# Patient Record
Sex: FEMALE | URBAN - NONMETROPOLITAN AREA
[De-identification: names, ages, dates, MRNs, and addresses within clinical notes are randomized per-mention and may not be internally consistent; named-entity substitution may affect disease eponyms.]

---

## 2019-05-17 ENCOUNTER — NURSE TRIAGE (OUTPATIENT)
Dept: ADMINISTRATIVE | Age: 69
End: 2019-05-17

## 2019-05-18 NOTE — TELEPHONE ENCOUNTER
Message from Peter Parkinson sent at 5/17/2019  8:05 PM EDT     Summary: confusion    Kristen Casillas had hip replacement surgery on her right hip on Tuesday.  She has been confused today- trying to get up and go to work, etc. Sheree Romo acts like she has dementia.  Please advise. Granddaughter Anola Kidney states, Miranda camilo is confused and trying to put her shoes on and trying to get up and for the last 3-4 hrs she has been confused. \"